# Patient Record
Sex: MALE | Race: ASIAN | NOT HISPANIC OR LATINO | Employment: OTHER | URBAN - METROPOLITAN AREA
[De-identification: names, ages, dates, MRNs, and addresses within clinical notes are randomized per-mention and may not be internally consistent; named-entity substitution may affect disease eponyms.]

---

## 2018-06-08 ENCOUNTER — APPOINTMENT (OUTPATIENT)
Dept: RADIOLOGY | Facility: MEDICAL CENTER | Age: 83
DRG: 871 | End: 2018-06-08
Attending: EMERGENCY MEDICINE
Payer: MEDICARE

## 2018-06-08 ENCOUNTER — HOSPITAL ENCOUNTER (INPATIENT)
Facility: MEDICAL CENTER | Age: 83
LOS: 3 days | DRG: 871 | End: 2018-06-11
Attending: EMERGENCY MEDICINE | Admitting: HOSPITALIST
Payer: MEDICARE

## 2018-06-08 DIAGNOSIS — S42.034A CLOSED NONDISPLACED FRACTURE OF ACROMIAL END OF RIGHT CLAVICLE, INITIAL ENCOUNTER: ICD-10-CM

## 2018-06-08 DIAGNOSIS — S22.31XA CLOSED FRACTURE OF ONE RIB OF RIGHT SIDE, INITIAL ENCOUNTER: ICD-10-CM

## 2018-06-08 DIAGNOSIS — S09.90XA CLOSED HEAD INJURY, INITIAL ENCOUNTER: ICD-10-CM

## 2018-06-08 DIAGNOSIS — R65.10 SIRS (SYSTEMIC INFLAMMATORY RESPONSE SYNDROME) (HCC): ICD-10-CM

## 2018-06-08 DIAGNOSIS — R00.0 TACHYCARDIA: ICD-10-CM

## 2018-06-08 LAB
ALBUMIN SERPL BCP-MCNC: 3.3 G/DL (ref 3.2–4.9)
ALBUMIN/GLOB SERPL: 0.9 G/DL
ALP SERPL-CCNC: 67 U/L (ref 30–99)
ALT SERPL-CCNC: 19 U/L (ref 2–50)
ANION GAP SERPL CALC-SCNC: 10 MMOL/L (ref 0–11.9)
ANION GAP SERPL CALC-SCNC: 8 MMOL/L (ref 0–11.9)
ANION GAP SERPL CALC-SCNC: 9 MMOL/L (ref 0–11.9)
APPEARANCE UR: CLEAR
AST SERPL-CCNC: 27 U/L (ref 12–45)
BACTERIA #/AREA URNS HPF: ABNORMAL /HPF
BASOPHILS # BLD AUTO: 0.1 % (ref 0–1.8)
BASOPHILS # BLD: 0.01 K/UL (ref 0–0.12)
BILIRUB SERPL-MCNC: 1.3 MG/DL (ref 0.1–1.5)
BILIRUB UR QL STRIP.AUTO: NEGATIVE
BUN SERPL-MCNC: 23 MG/DL (ref 8–22)
BUN SERPL-MCNC: 25 MG/DL (ref 8–22)
BUN SERPL-MCNC: 27 MG/DL (ref 8–22)
CALCIUM SERPL-MCNC: 7.3 MG/DL (ref 8.4–10.2)
CALCIUM SERPL-MCNC: 7.8 MG/DL (ref 8.4–10.2)
CALCIUM SERPL-MCNC: 8.4 MG/DL (ref 8.4–10.2)
CHLORIDE SERPL-SCNC: 90 MMOL/L (ref 96–112)
CHLORIDE SERPL-SCNC: 93 MMOL/L (ref 96–112)
CHLORIDE SERPL-SCNC: 96 MMOL/L (ref 96–112)
CO2 SERPL-SCNC: 18 MMOL/L (ref 20–33)
CO2 SERPL-SCNC: 20 MMOL/L (ref 20–33)
CO2 SERPL-SCNC: 20 MMOL/L (ref 20–33)
COLOR UR: YELLOW
CREAT SERPL-MCNC: 1.2 MG/DL (ref 0.5–1.4)
CREAT SERPL-MCNC: 1.21 MG/DL (ref 0.5–1.4)
CREAT SERPL-MCNC: 1.34 MG/DL (ref 0.5–1.4)
EOSINOPHIL # BLD AUTO: 0.01 K/UL (ref 0–0.51)
EOSINOPHIL NFR BLD: 0.1 % (ref 0–6.9)
ERYTHROCYTE [DISTWIDTH] IN BLOOD BY AUTOMATED COUNT: 45.6 FL (ref 35.9–50)
ERYTHROCYTE [DISTWIDTH] IN BLOOD BY AUTOMATED COUNT: 45.8 FL (ref 35.9–50)
FERRITIN SERPL-MCNC: 257.4 NG/ML (ref 22–322)
FOLATE SERPL-MCNC: >24 NG/ML
GLOBULIN SER CALC-MCNC: 3.5 G/DL (ref 1.9–3.5)
GLUCOSE SERPL-MCNC: 120 MG/DL (ref 65–99)
GLUCOSE SERPL-MCNC: 125 MG/DL (ref 65–99)
GLUCOSE SERPL-MCNC: 143 MG/DL (ref 65–99)
GLUCOSE UR STRIP.AUTO-MCNC: NEGATIVE MG/DL
HCT VFR BLD AUTO: 21.6 % (ref 42–52)
HCT VFR BLD AUTO: 25.9 % (ref 42–52)
HGB BLD-MCNC: 7.5 G/DL (ref 14–18)
HGB BLD-MCNC: 8.8 G/DL (ref 14–18)
IMM GRANULOCYTES # BLD AUTO: 0.05 K/UL (ref 0–0.11)
IMM GRANULOCYTES NFR BLD AUTO: 0.5 % (ref 0–0.9)
IRON SATN MFR SERPL: 19 % (ref 15–55)
IRON SERPL-MCNC: 48 UG/DL (ref 50–180)
KETONES UR STRIP.AUTO-MCNC: ABNORMAL MG/DL
LACTATE BLD-SCNC: 1.5 MMOL/L (ref 0.5–2)
LACTATE BLD-SCNC: 1.78 MMOL/L (ref 0.5–2)
LACTATE BLD-SCNC: 2.14 MMOL/L (ref 0.5–2)
LEUKOCYTE ESTERASE UR QL STRIP.AUTO: NEGATIVE
LYMPHOCYTES # BLD AUTO: 0.36 K/UL (ref 1–4.8)
LYMPHOCYTES NFR BLD: 3.3 % (ref 22–41)
MCH RBC QN AUTO: 33.2 PG (ref 27–33)
MCH RBC QN AUTO: 33.8 PG (ref 27–33)
MCHC RBC AUTO-ENTMCNC: 34 G/DL (ref 33.7–35.3)
MCHC RBC AUTO-ENTMCNC: 34.7 G/DL (ref 33.7–35.3)
MCV RBC AUTO: 97.3 FL (ref 81.4–97.8)
MCV RBC AUTO: 97.7 FL (ref 81.4–97.8)
MICRO URNS: ABNORMAL
MONOCYTES # BLD AUTO: 0.36 K/UL (ref 0–0.85)
MONOCYTES NFR BLD AUTO: 3.3 % (ref 0–13.4)
NEUTROPHILS # BLD AUTO: 10.09 K/UL (ref 1.82–7.42)
NEUTROPHILS NFR BLD: 92.7 % (ref 44–72)
NITRITE UR QL STRIP.AUTO: NEGATIVE
NRBC # BLD AUTO: 0 K/UL
NRBC BLD-RTO: 0 /100 WBC
OSMOLALITY UR: 417 MOSM/KG H2O (ref 300–900)
PH UR STRIP.AUTO: 6.5 [PH]
PLATELET # BLD AUTO: 190 K/UL (ref 164–446)
PLATELET # BLD AUTO: 205 K/UL (ref 164–446)
PMV BLD AUTO: 8.1 FL (ref 9–12.9)
PMV BLD AUTO: 8.2 FL (ref 9–12.9)
POTASSIUM SERPL-SCNC: 4.4 MMOL/L (ref 3.6–5.5)
POTASSIUM SERPL-SCNC: 4.7 MMOL/L (ref 3.6–5.5)
POTASSIUM SERPL-SCNC: 5.3 MMOL/L (ref 3.6–5.5)
PROCALCITONIN SERPL-MCNC: 2.36 NG/ML
PROT SERPL-MCNC: 6.8 G/DL (ref 6–8.2)
PROT UR QL STRIP: NEGATIVE MG/DL
RBC # BLD AUTO: 2.22 M/UL (ref 4.7–6.1)
RBC # BLD AUTO: 2.65 M/UL (ref 4.7–6.1)
RBC # URNS HPF: ABNORMAL /HPF
RBC UR QL AUTO: ABNORMAL
SODIUM SERPL-SCNC: 120 MMOL/L (ref 135–145)
SODIUM SERPL-SCNC: 122 MMOL/L (ref 135–145)
SODIUM SERPL-SCNC: 122 MMOL/L (ref 135–145)
SODIUM UR-SCNC: 61 MMOL/L
SP GR UR STRIP.AUTO: 1.01
TIBC SERPL-MCNC: 253 UG/DL (ref 250–450)
TROPONIN I SERPL-MCNC: <0.02 NG/ML (ref 0–0.04)
TSH SERPL DL<=0.005 MIU/L-ACNC: 2.62 UIU/ML (ref 0.38–5.33)
VIT B12 SERPL-MCNC: 983 PG/ML (ref 211–911)
WBC # BLD AUTO: 10.9 K/UL (ref 4.8–10.8)
WBC # BLD AUTO: 8.3 K/UL (ref 4.8–10.8)
WBC #/AREA URNS HPF: ABNORMAL /HPF

## 2018-06-08 PROCEDURE — 770020 HCHG ROOM/CARE - TELE (206)

## 2018-06-08 PROCEDURE — 87040 BLOOD CULTURE FOR BACTERIA: CPT | Mod: 91

## 2018-06-08 PROCEDURE — 83935 ASSAY OF URINE OSMOLALITY: CPT

## 2018-06-08 PROCEDURE — 700102 HCHG RX REV CODE 250 W/ 637 OVERRIDE(OP): Performed by: HOSPITALIST

## 2018-06-08 PROCEDURE — 85027 COMPLETE CBC AUTOMATED: CPT

## 2018-06-08 PROCEDURE — 80048 BASIC METABOLIC PNL TOTAL CA: CPT

## 2018-06-08 PROCEDURE — 99221 1ST HOSP IP/OBS SF/LOW 40: CPT | Mod: AI | Performed by: HOSPITALIST

## 2018-06-08 PROCEDURE — 93005 ELECTROCARDIOGRAM TRACING: CPT | Performed by: EMERGENCY MEDICINE

## 2018-06-08 PROCEDURE — 87077 CULTURE AEROBIC IDENTIFY: CPT

## 2018-06-08 PROCEDURE — 87186 SC STD MICRODIL/AGAR DIL: CPT

## 2018-06-08 PROCEDURE — 700105 HCHG RX REV CODE 258: Performed by: EMERGENCY MEDICINE

## 2018-06-08 PROCEDURE — 84443 ASSAY THYROID STIM HORMONE: CPT

## 2018-06-08 PROCEDURE — 83540 ASSAY OF IRON: CPT

## 2018-06-08 PROCEDURE — 84300 ASSAY OF URINE SODIUM: CPT

## 2018-06-08 PROCEDURE — 87070 CULTURE OTHR SPECIMN AEROBIC: CPT

## 2018-06-08 PROCEDURE — 71045 X-RAY EXAM CHEST 1 VIEW: CPT

## 2018-06-08 PROCEDURE — 87205 SMEAR GRAM STAIN: CPT

## 2018-06-08 PROCEDURE — 700111 HCHG RX REV CODE 636 W/ 250 OVERRIDE (IP): Performed by: HOSPITALIST

## 2018-06-08 PROCEDURE — 70450 CT HEAD/BRAIN W/O DYE: CPT

## 2018-06-08 PROCEDURE — 87086 URINE CULTURE/COLONY COUNT: CPT

## 2018-06-08 PROCEDURE — 82607 VITAMIN B-12: CPT

## 2018-06-08 PROCEDURE — 99285 EMERGENCY DEPT VISIT HI MDM: CPT

## 2018-06-08 PROCEDURE — 84145 PROCALCITONIN (PCT): CPT

## 2018-06-08 PROCEDURE — 81001 URINALYSIS AUTO W/SCOPE: CPT

## 2018-06-08 PROCEDURE — 96374 THER/PROPH/DIAG INJ IV PUSH: CPT

## 2018-06-08 PROCEDURE — 80053 COMPREHEN METABOLIC PANEL: CPT

## 2018-06-08 PROCEDURE — 83550 IRON BINDING TEST: CPT

## 2018-06-08 PROCEDURE — 83605 ASSAY OF LACTIC ACID: CPT

## 2018-06-08 PROCEDURE — 96361 HYDRATE IV INFUSION ADD-ON: CPT

## 2018-06-08 PROCEDURE — A9270 NON-COVERED ITEM OR SERVICE: HCPCS | Performed by: HOSPITALIST

## 2018-06-08 PROCEDURE — 36415 COLL VENOUS BLD VENIPUNCTURE: CPT

## 2018-06-08 PROCEDURE — 73030 X-RAY EXAM OF SHOULDER: CPT | Mod: RT

## 2018-06-08 PROCEDURE — 82728 ASSAY OF FERRITIN: CPT

## 2018-06-08 PROCEDURE — 82746 ASSAY OF FOLIC ACID SERUM: CPT

## 2018-06-08 PROCEDURE — 85025 COMPLETE CBC W/AUTO DIFF WBC: CPT

## 2018-06-08 PROCEDURE — 94760 N-INVAS EAR/PLS OXIMETRY 1: CPT

## 2018-06-08 PROCEDURE — 700111 HCHG RX REV CODE 636 W/ 250 OVERRIDE (IP): Performed by: EMERGENCY MEDICINE

## 2018-06-08 PROCEDURE — 700105 HCHG RX REV CODE 258: Performed by: HOSPITALIST

## 2018-06-08 PROCEDURE — 84484 ASSAY OF TROPONIN QUANT: CPT

## 2018-06-08 RX ORDER — CEFTRIAXONE 2 G/1
2 INJECTION, POWDER, FOR SOLUTION INTRAMUSCULAR; INTRAVENOUS ONCE
Status: COMPLETED | OUTPATIENT
Start: 2018-06-08 | End: 2018-06-08

## 2018-06-08 RX ORDER — SODIUM CHLORIDE 9 MG/ML
INJECTION, SOLUTION INTRAVENOUS CONTINUOUS
Status: DISCONTINUED | OUTPATIENT
Start: 2018-06-08 | End: 2018-06-11 | Stop reason: HOSPADM

## 2018-06-08 RX ORDER — SODIUM CHLORIDE 9 MG/ML
30 INJECTION, SOLUTION INTRAVENOUS ONCE
Status: COMPLETED | OUTPATIENT
Start: 2018-06-08 | End: 2018-06-08

## 2018-06-08 RX ORDER — BISACODYL 10 MG
10 SUPPOSITORY, RECTAL RECTAL
Status: DISCONTINUED | OUTPATIENT
Start: 2018-06-08 | End: 2018-06-11 | Stop reason: HOSPADM

## 2018-06-08 RX ORDER — SODIUM CHLORIDE 9 MG/ML
30 INJECTION, SOLUTION INTRAVENOUS
Status: DISCONTINUED | OUTPATIENT
Start: 2018-06-08 | End: 2018-06-11 | Stop reason: HOSPADM

## 2018-06-08 RX ORDER — ACETAMINOPHEN 160 MG
2000 TABLET,DISINTEGRATING ORAL EVERY MORNING
COMMUNITY

## 2018-06-08 RX ORDER — POLYETHYLENE GLYCOL 3350 17 G/17G
1 POWDER, FOR SOLUTION ORAL
Status: DISCONTINUED | OUTPATIENT
Start: 2018-06-08 | End: 2018-06-11 | Stop reason: HOSPADM

## 2018-06-08 RX ORDER — LOVASTATIN 20 MG/1
10 TABLET ORAL EVERY MORNING
Status: DISCONTINUED | OUTPATIENT
Start: 2018-06-09 | End: 2018-06-11 | Stop reason: HOSPADM

## 2018-06-08 RX ORDER — TAMSULOSIN HYDROCHLORIDE 0.4 MG/1
0.4 CAPSULE ORAL
Status: DISCONTINUED | OUTPATIENT
Start: 2018-06-09 | End: 2018-06-11 | Stop reason: HOSPADM

## 2018-06-08 RX ORDER — AMOXICILLIN 250 MG
2 CAPSULE ORAL 2 TIMES DAILY
Status: DISCONTINUED | OUTPATIENT
Start: 2018-06-08 | End: 2018-06-11 | Stop reason: HOSPADM

## 2018-06-08 RX ORDER — HEPARIN SODIUM 5000 [USP'U]/ML
5000 INJECTION, SOLUTION INTRAVENOUS; SUBCUTANEOUS EVERY 8 HOURS
Status: DISCONTINUED | OUTPATIENT
Start: 2018-06-08 | End: 2018-06-09

## 2018-06-08 RX ORDER — LOVASTATIN 10 MG/1
10 TABLET ORAL EVERY MORNING
COMMUNITY

## 2018-06-08 RX ORDER — SODIUM CHLORIDE 9 MG/ML
500 INJECTION, SOLUTION INTRAVENOUS
Status: COMPLETED | OUTPATIENT
Start: 2018-06-08 | End: 2018-06-08

## 2018-06-08 RX ORDER — ACETAMINOPHEN 325 MG/1
650 TABLET ORAL EVERY 6 HOURS PRN
Status: DISCONTINUED | OUTPATIENT
Start: 2018-06-08 | End: 2018-06-11 | Stop reason: HOSPADM

## 2018-06-08 RX ADMIN — CEFTRIAXONE SODIUM 2 G: 2 INJECTION, POWDER, FOR SOLUTION INTRAMUSCULAR; INTRAVENOUS at 10:26

## 2018-06-08 RX ADMIN — SODIUM CHLORIDE: 9 INJECTION, SOLUTION INTRAVENOUS at 21:03

## 2018-06-08 RX ADMIN — HEPARIN SODIUM 5000 UNITS: 5000 INJECTION, SOLUTION INTRAVENOUS; SUBCUTANEOUS at 14:43

## 2018-06-08 RX ADMIN — AMPICILLIN AND SULBACTAM 3 G: 2; 1 INJECTION, POWDER, FOR SOLUTION INTRAVENOUS at 16:23

## 2018-06-08 RX ADMIN — STANDARDIZED SENNA CONCENTRATE AND DOCUSATE SODIUM 2 TABLET: 8.6; 5 TABLET, FILM COATED ORAL at 21:11

## 2018-06-08 RX ADMIN — SODIUM CHLORIDE 500 ML: 9 INJECTION, SOLUTION INTRAVENOUS at 14:45

## 2018-06-08 RX ADMIN — SODIUM CHLORIDE 1290 ML: 9 INJECTION, SOLUTION INTRAVENOUS at 09:18

## 2018-06-08 RX ADMIN — HEPARIN SODIUM 5000 UNITS: 5000 INJECTION, SOLUTION INTRAVENOUS; SUBCUTANEOUS at 21:05

## 2018-06-08 ASSESSMENT — PATIENT HEALTH QUESTIONNAIRE - PHQ9
2. FEELING DOWN, DEPRESSED, IRRITABLE, OR HOPELESS: NOT AT ALL
SUM OF ALL RESPONSES TO PHQ9 QUESTIONS 1 AND 2: 0
2. FEELING DOWN, DEPRESSED, IRRITABLE, OR HOPELESS: NOT AT ALL
SUM OF ALL RESPONSES TO PHQ9 QUESTIONS 1 AND 2: 0
1. LITTLE INTEREST OR PLEASURE IN DOING THINGS: NOT AT ALL
1. LITTLE INTEREST OR PLEASURE IN DOING THINGS: NOT AT ALL

## 2018-06-08 ASSESSMENT — LIFESTYLE VARIABLES: EVER_SMOKED: NEVER

## 2018-06-08 ASSESSMENT — PAIN SCALES - WONG BAKER
WONGBAKER_NUMERICALRESPONSE: DOESN'T HURT AT ALL
WONGBAKER_NUMERICALRESPONSE: DOESN'T HURT AT ALL
WONGBAKER_NUMERICALRESPONSE: HURTS JUST A LITTLE BIT

## 2018-06-08 ASSESSMENT — PAIN SCALES - GENERAL
PAINLEVEL_OUTOF10: 0
PAINLEVEL_OUTOF10: 0
PAINLEVEL_OUTOF10: ASSUMED PAIN PRESENT
PAINLEVEL_OUTOF10: 0

## 2018-06-08 NOTE — ED NOTES
Pt bib family via w/c; c/o t5000 glf x1wk ago (last Thursday). Pt fell onto hs R side; abrasion and swelling and bruising to R cheek and R shoulder. Family at

## 2018-06-08 NOTE — ED NOTES
Pt resting in gurney, reports pain tolerable at this time. Assited to reposition, no other needs or complaints at this time. Abx finished infusing prior to assuming care.

## 2018-06-08 NOTE — ED NOTES
straight cath procedure explained and performed. Privacy maintained for pt. Pt tolerated procedure well. Urine to lab. Warm blanket provided. Will continue to monitor.

## 2018-06-08 NOTE — ED NOTES
Med rec complete per Rx bottles provided by pt's family at bedside. Meds reviewed with family. Bottles returned to family. WADE.

## 2018-06-08 NOTE — PROGRESS NOTES
Received report from ED RNs. Patient admitted to room 314-1, in the service of Dr. Perdue.  Patient's son reports that the family would like DNR status. Telemetry connected, orders received and carried out. Will continue to monitor.

## 2018-06-08 NOTE — ED NOTES
[place] fall assessment completed. Pt is High risk for fall. Interventions complete. Pt placed in yellow non slip socks, wrist band placed, green sign on door. Bed locked in low position, call light in place. Personal possessions in place. Personal needs assessed. Charge nurse notified to move pt to a more visible room if available. Safety assessed. Will monitor frequently.

## 2018-06-08 NOTE — ED NOTES
ERP at bedside. Pt agrees with plan of care discussed by ERP. AIDET acknowledged with patient. Yvonne in low position, side rail up for pt safety. Call light within reach. Will continue to monitor.

## 2018-06-08 NOTE — ED PROVIDER NOTES
"ED Provider Note    CHIEF COMPLAINT  Chief Complaint   Patient presents with   • T-5000 GLF       HPI  Charan Juan is a 89 y.o. male who presents with family given history that he fell about 6 days ago at his home when using his walker to the right side striking his right upper cheek laterally as well as his right shoulder which has become discolored after being initially ecchymotic and now more yellowed with age. He describes minimal shoulder discomfort with movement and his son reports that he has been weaker over the last few days after the fall. He does take daily aspirin. The patient denies any headache or new visual complaints. He is hard of hearing. Patient reports that he is DNR    REVIEW OF SYSTEMS  See HPI for further details. All other systems are negative.     PAST MEDICAL HISTORY  Past Medical History:   Diagnosis Date   • Hypertension        FAMILY HISTORY  No family history on file.    SOCIAL HISTORY   reports that he has never smoked. He does not have any smokeless tobacco history on file. He reports that he does not drink alcohol or use drugs.    SURGICAL HISTORY  Past Surgical History:   Procedure Laterality Date   • GASTROSCOPY WITH BIOPSY  11/5/08    Performed by NESHA VINES at ENDOSCOPY [facility] ORS   • COLONOSCOPY WITH POLYP  11/5/08    Performed by NESHA VINES at ENDOSCOPY [facility] ORS   • OTHER ABDOMINAL SURGERY      appendectomy       CURRENT MEDICATIONS  Home Medications    **Home medications have not yet been reviewed for this encounter**         ALLERGIES  Allergies   Allergen Reactions   • Nkda [No Known Drug Allergy]        PHYSICAL EXAM  VITAL SIGNS: BP (!) 89/66   Pulse (!) 117   Temp 36.1 °C (97 °F)   Resp 17   Ht 1.6 m (5' 3\")   Wt 43 kg (94 lb 12.8 oz)   SpO2 92%   BMI 16.79 kg/m²    Constitutional: Elderly male, hard of hearing, No acute distress, Non-toxic appearance.   HENT: Normocephalic, Atraumatic, Bilateral external ears normal, Oropharynx is clear " mucous membranes are dry. No oral exudates or nasal discharge.   Eyes: Pupils are equal round and reactive, EOMI, Conjunctiva normal, No discharge.   Neck: Normal range of motion, No tenderness, Supple, No stridor. No meningismus.  Lymphatic: No lymphadenopathy noted.   Cardiovascular: Tachycardic rate and rhythm without murmur rub or gallop.  Thorax & Lungs: Clear breath sounds bilaterally without wheezes, rhonchi or rales. There is no chest wall tenderness.   Abdomen: Soft non-tender non-distended. There is no rebound or guarding. No organomegaly is appreciated. Bowel sounds are normal.  Skin: Yellowing of skin about the right shoulder both anterior and posterior without rash.   Back: No CVA or spinal tenderness.   Extremities: Intact distal pulses, No edema, minimal right shoulder tenderness, No cyanosis, No clubbing. Capillary refill is less than 2 seconds.  Musculoskeletal: Good range of motion in all major joints. No tenderness to palpation or major deformities noted.   Neurologic: Alert & oriented x 3, Normal motor function, Normal sensory function, No focal deficits noted. Reflexes are normal. Gait not tested. He is mostly wheelchair and bedbound  Psychiatric: Affect normal, Judgment normal, Mood normal. There is no suicidal ideation or patient reported hallucinations.     EKG  EKG Interpretation    Interpreted by emergency department physician    Rhythm: sinus tachycardia  Rate: tachycardia  Axis: normal  Ectopy: none  Conduction: normal  ST Segments: no acute change  T Waves: no acute change  Q Waves: nonspecific    Clinical Impression: sinus tachycardia        RADIOLOGY/PROCEDURES  DX-SHOULDER 2+ RIGHT   Final Result      Inferiorly displaced distal thirds right clavicle acute fracture      Suspicious for right posterolateral second rib acute fracture      CT-HEAD W/O   Final Result      No noncontrast CT evidence of acute intracranial hemorrhage.      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  This is a nonspecific finding which usually is found to represent chronic microvascular disease in patient's of this demographic.  Demyelination, age indeterminant ischemia and gliosis are also common    possibilities.      DX-CHEST-PORTABLE (1 VIEW)   Final Result      Some new reticular basilar opacity could indicate bronchitis of acute or chronic nature. Aspiration pneumonitis is a consideration.      Stable aortic ectasia            COURSE & MEDICAL DECISION MAKING  Pertinent Labs & Imaging studies reviewed. (See chart for details)  The patient had a substantial fall 6 days ago that resulted in head injury and right shoulder injury. I was concerned about his presentation for tachycardia and dry mucous membranes and performs sepsis workup initially.    Normal saline 30 mL/kg IV was given for tachycardia and dry mucous membranes.    CT of the head was performed to rule out intracranial hemorrhage or mass this was negative for both. 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    Chest x-ray shows new reticular opacity that could be acute or chronic bronchitis versus aspiration pneumonitis. I initiated Rocephin therapy at after seeing his lactate at 2.1.    The patient was fluid responsive and is pressure improved to over 110. Tachycardia also improved downward to about 100.    Laboratory evaluation reveals urinalysis that is negative for infection. White blood cell counts elevated at 10,900 with 93% neutrophil shift. He is hyponatremic at 120 which is contributory without evidence of acidemia or renal dysfunction. BUN is elevated at 27 consistent with dehydration. Troponin is normal.    2nd lactate was obtained at 1131 hrs. which is down to 1.5 after 30 mL/kg of normal saline, again a positive response    We will continue to treat the patient for possible infection. He is admitted to Dr. Perdue.  He still has significant risk of decline but is DNR status.    Please note critical care time 30 minutes is spent in the care of this patient outside of procedure time    FINAL IMPRESSION  1. SIRS (systemic inflammatory response syndrome) (HCC)    2. Tachycardia    3. Closed head injury, initial encounter    4. Closed fracture of one rib of right side, initial encounter    5. Closed nondisplaced fracture of acromial end of right clavicle, initial encounter     critical care time, 30 minutes         Electronically signed by: Pedro Romano, 6/8/2018 9:14 AM

## 2018-06-08 NOTE — FLOWSHEET NOTE
06/08/18 1445   Events/Summary/Plan   Events/Summary/Plan RCP completed   Respiratory WDL   Respiratory (WDL) X   Chest Exam   Respiration 18   Heart Rate (Monitored) (!) 116   Breath Sounds   RUL Breath Sounds Diminished   RML Breath Sounds Diminished;Fine Crackles   RLL Breath Sounds Diminished;Fine Crackles   IGNACIO Breath Sounds Diminished   LLL Breath Sounds Diminished;Fine Crackles   Oximetry   #Pulse Oximetry (Single Determination) Yes   Oxygen   Home O2 Use Prior To Admission? No   Pulse Oximetry 94 %   O2 (LPM) 0   O2 Daily Delivery Respiratory  Room Air with O2 Available

## 2018-06-09 PROBLEM — A40.9 STREPTOCOCCAL SEPSIS (HCC): Status: ACTIVE | Noted: 2018-06-09

## 2018-06-09 PROBLEM — J69.0 ASPIRATION PNEUMONIA (HCC): Status: ACTIVE | Noted: 2018-06-09

## 2018-06-09 PROBLEM — S22.39XA RIB FRACTURE: Status: ACTIVE | Noted: 2018-06-09

## 2018-06-09 PROBLEM — E86.0 DEHYDRATION: Status: ACTIVE | Noted: 2018-06-09

## 2018-06-09 PROBLEM — D64.9 ANEMIA: Status: ACTIVE | Noted: 2018-06-09

## 2018-06-09 PROBLEM — N40.0 BENIGN PROSTATIC HYPERPLASIA WITHOUT LOWER URINARY TRACT SYMPTOMS: Status: ACTIVE | Noted: 2018-06-09

## 2018-06-09 PROBLEM — E87.1 HYPONATREMIA: Status: ACTIVE | Noted: 2018-06-09

## 2018-06-09 LAB
ANION GAP SERPL CALC-SCNC: 10 MMOL/L (ref 0–11.9)
ANION GAP SERPL CALC-SCNC: 9 MMOL/L (ref 0–11.9)
BUN SERPL-MCNC: 19 MG/DL (ref 8–22)
BUN SERPL-MCNC: 20 MG/DL (ref 8–22)
CALCIUM SERPL-MCNC: 7.7 MG/DL (ref 8.4–10.2)
CALCIUM SERPL-MCNC: 7.8 MG/DL (ref 8.4–10.2)
CHLORIDE SERPL-SCNC: 96 MMOL/L (ref 96–112)
CHLORIDE SERPL-SCNC: 98 MMOL/L (ref 96–112)
CO2 SERPL-SCNC: 19 MMOL/L (ref 20–33)
CO2 SERPL-SCNC: 20 MMOL/L (ref 20–33)
CREAT SERPL-MCNC: 1.14 MG/DL (ref 0.5–1.4)
CREAT SERPL-MCNC: 1.22 MG/DL (ref 0.5–1.4)
GLUCOSE SERPL-MCNC: 113 MG/DL (ref 65–99)
GLUCOSE SERPL-MCNC: 99 MG/DL (ref 65–99)
GRAM STN SPEC: NORMAL
IRON SATN MFR SERPL: 18 % (ref 15–55)
IRON SERPL-MCNC: 38 UG/DL (ref 50–180)
POTASSIUM SERPL-SCNC: 4.1 MMOL/L (ref 3.6–5.5)
POTASSIUM SERPL-SCNC: 4.3 MMOL/L (ref 3.6–5.5)
SIGNIFICANT IND 70042: NORMAL
SITE SITE: NORMAL
SODIUM SERPL-SCNC: 125 MMOL/L (ref 135–145)
SODIUM SERPL-SCNC: 127 MMOL/L (ref 135–145)
SOURCE SOURCE: NORMAL
TIBC SERPL-MCNC: 211 UG/DL (ref 250–450)

## 2018-06-09 PROCEDURE — 83540 ASSAY OF IRON: CPT

## 2018-06-09 PROCEDURE — 83550 IRON BINDING TEST: CPT

## 2018-06-09 PROCEDURE — G8996 SWALLOW CURRENT STATUS: HCPCS | Mod: CL

## 2018-06-09 PROCEDURE — 92610 EVALUATE SWALLOWING FUNCTION: CPT

## 2018-06-09 PROCEDURE — 306396 CUSHION, WAFFLE ADULT 17X17: Performed by: FAMILY MEDICINE

## 2018-06-09 PROCEDURE — 80048 BASIC METABOLIC PNL TOTAL CA: CPT

## 2018-06-09 PROCEDURE — 700105 HCHG RX REV CODE 258: Performed by: HOSPITALIST

## 2018-06-09 PROCEDURE — 700111 HCHG RX REV CODE 636 W/ 250 OVERRIDE (IP): Performed by: HOSPITALIST

## 2018-06-09 PROCEDURE — 770020 HCHG ROOM/CARE - TELE (206)

## 2018-06-09 PROCEDURE — 700102 HCHG RX REV CODE 250 W/ 637 OVERRIDE(OP): Performed by: FAMILY MEDICINE

## 2018-06-09 PROCEDURE — G8997 SWALLOW GOAL STATUS: HCPCS | Mod: CK

## 2018-06-09 PROCEDURE — 700102 HCHG RX REV CODE 250 W/ 637 OVERRIDE(OP): Performed by: HOSPITALIST

## 2018-06-09 PROCEDURE — 99232 SBSQ HOSP IP/OBS MODERATE 35: CPT | Performed by: FAMILY MEDICINE

## 2018-06-09 PROCEDURE — A9270 NON-COVERED ITEM OR SERVICE: HCPCS | Performed by: FAMILY MEDICINE

## 2018-06-09 PROCEDURE — A9270 NON-COVERED ITEM OR SERVICE: HCPCS | Performed by: HOSPITALIST

## 2018-06-09 RX ADMIN — METOPROLOL TARTRATE 25 MG: 25 TABLET ORAL at 14:06

## 2018-06-09 RX ADMIN — AMPICILLIN AND SULBACTAM 3 G: 2; 1 INJECTION, POWDER, FOR SOLUTION INTRAVENOUS at 14:03

## 2018-06-09 RX ADMIN — SODIUM CHLORIDE: 9 INJECTION, SOLUTION INTRAVENOUS at 09:41

## 2018-06-09 RX ADMIN — LOVASTATIN 10 MG: 20 TABLET ORAL at 09:34

## 2018-06-09 RX ADMIN — SODIUM CHLORIDE: 9 INJECTION, SOLUTION INTRAVENOUS at 21:34

## 2018-06-09 RX ADMIN — METOPROLOL TARTRATE 25 MG: 25 TABLET ORAL at 21:23

## 2018-06-09 RX ADMIN — STANDARDIZED SENNA CONCENTRATE AND DOCUSATE SODIUM 2 TABLET: 8.6; 5 TABLET, FILM COATED ORAL at 09:34

## 2018-06-09 RX ADMIN — STANDARDIZED SENNA CONCENTRATE AND DOCUSATE SODIUM 2 TABLET: 8.6; 5 TABLET, FILM COATED ORAL at 21:23

## 2018-06-09 RX ADMIN — ACETAMINOPHEN 650 MG: 325 TABLET, FILM COATED ORAL at 21:23

## 2018-06-09 RX ADMIN — TAMSULOSIN HYDROCHLORIDE 0.4 MG: 0.4 CAPSULE ORAL at 09:37

## 2018-06-09 RX ADMIN — AMPICILLIN AND SULBACTAM 3 G: 2; 1 INJECTION, POWDER, FOR SOLUTION INTRAVENOUS at 02:30

## 2018-06-09 RX ADMIN — HEPARIN SODIUM 5000 UNITS: 5000 INJECTION, SOLUTION INTRAVENOUS; SUBCUTANEOUS at 05:58

## 2018-06-09 ASSESSMENT — PATIENT HEALTH QUESTIONNAIRE - PHQ9
1. LITTLE INTEREST OR PLEASURE IN DOING THINGS: NOT AT ALL
2. FEELING DOWN, DEPRESSED, IRRITABLE, OR HOPELESS: NOT AT ALL
SUM OF ALL RESPONSES TO PHQ9 QUESTIONS 1 AND 2: 0

## 2018-06-09 ASSESSMENT — ENCOUNTER SYMPTOMS
RESPIRATORY NEGATIVE: 1
GASTROINTESTINAL NEGATIVE: 1
NEUROLOGICAL NEGATIVE: 1
MYALGIAS: 1
EYES NEGATIVE: 1
PSYCHIATRIC NEGATIVE: 1

## 2018-06-09 ASSESSMENT — PAIN SCALES - GENERAL: PAINLEVEL_OUTOF10: 2

## 2018-06-09 NOTE — ASSESSMENT & PLAN NOTE
This is sepsis (without associated acute organ dysfunction).     Iv unasyn started  Possibly 2nd to aspiration pneumona  D/w the son and the wife at the bed side

## 2018-06-09 NOTE — CARE PLAN
Problem: Venous Thromboembolism (VTW)/Deep Vein Thrombosis (DVT) Prevention:  Goal: Patient will participate in Venous Thrombosis (VTE)/Deep Vein Thrombosis (DVT)Prevention Measures  Outcome: PROGRESSING AS EXPECTED  Subcutaneous Heparin in use for prophylaxis    Problem: Skin Integrity  Goal: Risk for impaired skin integrity will decrease  Outcome: PROGRESSING AS EXPECTED  Assisted patient to turn & reposition every 2 hours,kept dry and clean. place pillows on bony prominences to prevent skin breakdown.

## 2018-06-09 NOTE — PROGRESS NOTES
Alert and oriented  to person,place and time.No edema, on room air sat 92%. Denied pain at this time.Due medication given per MAR. Family by the bedside.States understanding of POC.

## 2018-06-09 NOTE — H&P
Hospital Medicine History and Physical    Date of Service  6/8/2018    Chief Complaint  Chief Complaint   Patient presents with   • T-5000 GLF       History of Presenting Illness  Charan Juan is a 89 y.o. male who presents with family given history that he fell about 6 days ago at his home when using his walker to the right side striking his right upper cheek laterally as well as his right shoulder which has become swollen and bruised.  He describes minimal shoulder discomfort with movement and his son reports that he has been weaker over the last few days after the fall. He does take daily aspirin.     Xray of chest shows right rib fractures    He had a low BP in er attributed to dehydration    He was found to be anemic with hb 8.8    I checked and found elevated procalcitonin level. Concern for aspiration pneumonitis  Primary Care Physician  Napoleon Tobin M.D.    Consultants  none    Code Status    dnr    Review of Systems  Review of Systems   Constitutional: Positive for malaise/fatigue.   HENT: Negative.    Eyes: Negative.    Respiratory: Negative.    Cardiovascular: Positive for chest pain.   Gastrointestinal: Negative.    Genitourinary: Negative for dysuria and frequency.   Musculoskeletal: Positive for joint pain and myalgias.   Neurological: Negative.    Psychiatric/Behavioral: Negative.    All other systems reviewed and are negative.       Past Medical History  Past Medical History:   Diagnosis Date   • Hypertension        Surgical History  Past Surgical History:   Procedure Laterality Date   • GASTROSCOPY WITH BIOPSY  11/5/08    Performed by NESHA VINES at ENDOSCOPY [facility] ORS   • COLONOSCOPY WITH POLYP  11/5/08    Performed by NESHA VINES at ENDOSCOPY [facility] ORS   • OTHER ABDOMINAL SURGERY      appendectomy       Medications  No current facility-administered medications on file prior to encounter.      Current Outpatient Prescriptions on File Prior to Encounter   Medication Sig Dispense  Refill   • tamsulosin (FLOMAX) 0.4 MG capsule Take 0.4 mg by mouth ONE-HALF HOUR AFTER BREAKFAST.     • lisinopril (PRINIVIL) 10 MG TABS Take 10 mg by mouth every day.         Family History  No family history on file.    Social History  Social History   Substance Use Topics   • Smoking status: Never Smoker   • Smokeless tobacco: Not on file   • Alcohol use No       Allergies  Allergies   Allergen Reactions   • Nkda [No Known Drug Allergy]         Physical Exam  Laboratory   Hemodynamics  Temp (24hrs), Av.5 °C (97.7 °F), Min:36.1 °C (97 °F), Max:36.9 °C (98.5 °F)   Temperature: 36.2 °C (97.1 °F)  Pulse  Av.7  Min: 108  Max: 129 Heart Rate (Monitored): (!) 116  Blood Pressure : 112/72, NIBP: 101/70      Respiratory      Respiration: 18, Pulse Oximetry: 95 %, O2 Daily Delivery Respiratory : Room Air with O2 Available        RUL Breath Sounds: Diminished, RML Breath Sounds: Diminished, RLL Breath Sounds: Diminished, IGNACIO Breath Sounds: Diminished, LLL Breath Sounds: Diminished    Physical Exam   Constitutional: He is oriented to person, place, and time. No distress.   HENT:   Head: Normocephalic and atraumatic.   Mouth/Throat: No oropharyngeal exudate.   Eyes: Left eye exhibits no discharge. No scleral icterus.   Neck: No JVD present. No tracheal deviation present. No thyromegaly present.   Cardiovascular: Normal rate and regular rhythm.  Exam reveals no gallop and no friction rub.    No murmur heard.  Pulmonary/Chest: He exhibits tenderness (right).   Abdominal: He exhibits no distension. There is no tenderness. There is no rebound and no guarding.   Musculoskeletal:   Ecchymoses right shoulder and upper chest   Neurological: He is alert and oriented to person, place, and time.   General weakness   Skin: He is not diaphoretic.       Recent Labs      18   0920  18   2311   WBC  10.9*  8.3   RBC  2.65*  2.22*   HEMOGLOBIN  8.8*  7.5*   HEMATOCRIT  25.9*  21.6*   MCV  97.7  97.3   MCH  33.2*  33.8*    MCHC  34.0  34.7   RDW  45.6  45.8   PLATELETCT  205  190   MPV  8.1*  8.2*     Recent Labs      18   0920  18   1523  18   2311   SODIUM  120*  122*  122*   POTASSIUM  5.3  4.7  4.4   CHLORIDE  90*  96  93*   CO2  20  18*  20   GLUCOSE  143*  125*  120*   BUN  27*  25*  23*   CREATININE  1.34  1.20  1.21   CALCIUM  8.4  7.3*  7.8*     Recent Labs      18   0920  18   1523  18   2311   ALTSGPT  19   --    --    ASTSGOT  27   --    --    ALKPHOSPHAT  67   --    --    TBILIRUBIN  1.3   --    --    GLUCOSE  143*  125*  120*                 Lab Results   Component Value Date    TROPONINI <0.02 2018     Urinalysis:    Lab Results  Component Value Date/Time   SPECGRAVITY 1.010 2018 1012   GLUCOSEUR Negative 2018 1012   KETONES Trace (A) 2018 1012   NITRITE Negative 2018 1012   WBCURINE 0-2 (A) 2018 1012   RBCURINE 10-20 (A) 2018 1012   BACTERIA Rare (A) 2018 1012   EPITHELCELL Few 2013 1725        Imaging  Patient Name  Charan Juan ([id number]) Sex  Male   10/7/1921   Room Bed Code Status Current Location   [address] [place] [address] DNR 01   Reprint Order Requisition     DX-SHOULDER 2+ RIGHT (Order #[id number]) on 18   Last Resulted Time    10:46 AM   Images     Show images for DX-SHOULDER 2+ RIGHT   Imaging Result Status     Status: Final result (Exam End: 2018 10:39 AM)   Imaging Previous Results     Open Hard Copy Result Report (Order #[id number] - DX-SHOULDER 2+ RIGHT)   Narrative       2018 10:00 AM    HISTORY/REASON FOR EXAM: Fall yesterday. Bruising Pain/Deformity Following Trauma    TECHNIQUE/EXAM DESCRIPTION AND NUMBER OF VIEWS:  3 views of the RIGHT shoulder.    COMPARISON: None    FINDINGS:  There is a fracture involving the distal third of the clavicle which appears extra-articular. There is one third inferior shaft width displacement, apex cranial angulation. No acromioclavicular joint widening.    Suspicious  for a second posterolateral rib fracture    There is no other evidence of acute displaced fracture or dislocation.   Impression       Inferiorly displaced distal thirds right clavicle acute fracture    Suspicious for right posterolateral second rib acute fracture        Assessment/Plan     I anticipate this patient will require at least two midnights for appropriate medical management, necessitating inpatient admission.    * suspected Streptococcal sepsis (HCC)- (present on admission)   Assessment & Plan    This is sepsis (without associated acute organ dysfunction).     Iv unasyn started    Follow all cultures        Hyponatremia- (present on admission)   Assessment & Plan    Cause unknown    Check urine sodium and osmolarity    Hydrate with saline    Check bmp every 8 hours        Benign prostatic hyperplasia without lower urinary tract symptoms- (present on admission)   Assessment & Plan    Cont flomax        Rib fracture- (present on admission)   Assessment & Plan    Pain control        Dehydration- (present on admission)   Assessment & Plan    hydrate        Anemia- (present on admission)   Assessment & Plan    Cause  Unknown    Definitely some acute loss after right shoulder injury    Check anemia w/u    Check am cbc        Aspiration pneumonia (HCC)- (present on admission)   Assessment & Plan    Started iv unasyn    hydrate            VTE prophylaxis: scd

## 2018-06-09 NOTE — PROGRESS NOTES
[facility] Hospitalist Progress Note    Date of Service: 2018    Chief Complaint  89 y.o. male admitted 2018 with aspiration pneumonia  And hyponatremia    Interval Problem Update  none    Consultants/Specialty  none    Disposition  pending        Review of Systems   Unable to perform ROS: Acuity of condition      Physical Exam  Laboratory/Imaging   Hemodynamics  Temp (24hrs), Av.5 °C (97.7 °F), Min:36.2 °C (97.1 °F), Max:36.9 °C (98.5 °F)   Temperature: 36.6 °C (97.8 °F)  Pulse  Av.4  Min: 103  Max: 129 Heart Rate (Monitored): (!) 116  Blood Pressure : 146/86      Respiratory      Respiration: 18, Pulse Oximetry: 94 %, O2 Daily Delivery Respiratory : Room Air with O2 Available        RUL Breath Sounds: Diminished, RML Breath Sounds: Diminished, RLL Breath Sounds: Diminished, IGNACIO Breath Sounds: Diminished, LLL Breath Sounds: Diminished    Fluids    Intake/Output Summary (Last 24 hours) at 18 1236  Last data filed at 18 1800   Gross per 24 hour   Intake              390 ml   Output                0 ml   Net              390 ml       Nutrition  Orders Placed This Encounter   Procedures   • Diet Order     Standing Status:   Standing     Number of Occurrences:   1     Order Specific Question:   Diet:     Answer:   Regular [1]     Physical Exam   Constitutional: No distress.   HENT:   Head: Normocephalic.   Eyes: Conjunctivae are normal. Pupils are equal, round, and reactive to light.   Neck: Normal range of motion. Neck supple.   Cardiovascular: Normal rate and regular rhythm.    Pulmonary/Chest: Effort normal and breath sounds normal.   Abdominal: Soft. Bowel sounds are normal.   Musculoskeletal: He exhibits no edema.   Neurological: He is alert.   Skin: Skin is warm. Rash (echymosis on the r.shoulder) noted. He is not diaphoretic.       Recent Labs      18   0920  18   2311   WBC  10.9*  8.3   RBC  2.65*  2.22*   HEMOGLOBIN  8.8*  7.5*   HEMATOCRIT  25.9*  21.6*   MCV  97.7  97.3    MCH  33.2*  33.8*   MCHC  34.0  34.7   RDW  45.6  45.8   PLATELETCT  205  190   MPV  8.1*  8.2*     Recent Labs      06/08/18   1523  06/08/18   2311  06/09/18   0648   SODIUM  122*  122*  125*   POTASSIUM  4.7  4.4  4.3   CHLORIDE  96  93*  96   CO2  18*  20  19*   GLUCOSE  125*  120*  99   BUN  25*  23*  20   CREATININE  1.20  1.21  1.22   CALCIUM  7.3*  7.8*  7.8*                      Assessment/Plan     * suspected Streptococcal sepsis (HCC)- (present on admission)   Assessment & Plan    This is sepsis (without associated acute organ dysfunction).     Iv unasyn started  Possibly 2nd to aspiration pneumona  D/w the son and the wife at the bed side        Hyponatremia- (present on admission)   Assessment & Plan    Iv fluid n.s  Will check bmp in am          Benign prostatic hyperplasia without lower urinary tract symptoms- (present on admission)   Assessment & Plan    Cont flomax        Rib fracture- (present on admission)   Assessment & Plan    Pain control  Pt and ot        Dehydration- (present on admission)   Assessment & Plan    hydrate        Anemia- (present on admission)   Assessment & Plan    Normocytic  Iron studies  No sign of bleeding  Will check stool          Aspiration pneumonia (HCC)- (present on admission)   Assessment & Plan    Started iv unasyn  speech eval for diet modification          Quality-Core Measures   DVT prophylaxis - mechanical:  SCDs

## 2018-06-09 NOTE — DIETARY
NUTRITION SERVICES - Low BMI on Admit Brief Note  The pt is a 89 yo male with an admitting dx of dehydration, sinus tachycardia, sepsis, and aspiration pneumonitis.    Per review of chart the pt was noted to have BMI <19 (=16.79). Pt was using his walker on or around 6/2 and sustained GLF to right side striking his cheek and shoulder. Per MD notes, he was found to have R rib fractures and elevated procalcitonin, concerning for aspiration pneumonitis. PO intake has been adequate so far with 50-75% x2 meal records. No nutrition risk screening completed at this time. Attempted phone call to pt's room to discuss meals but no response. Pt visited in his room this afternoon and family in hallway. Son (Isma) provided some food preferences in order to better tailor meals for the pt. They have been bringing in some food from home. Isma encouraged to continue to do so. Family thanked for the information.    Labs, meds, fluids, GI, skin, PMHx, PO intake - all reviewed    Malnutrition Risk: Age, BMI <23 for age >65    Recommendations/Plan:  1. Continue current diet. Protein-centered afternoon snack.  2. Encourage intake of meals  3. Family to continue bringing in food from home  4. Document intake of all meals as % taken in ADL's to provide interdisciplinary communication across all shifts.   5. Monitor weight.  6. Nutrition rep will continue to see patient for ongoing meal and snack preferences.     RD monitoring.

## 2018-06-09 NOTE — THERAPY
"Speech Language Therapy Clinical Swallow Evaluation completed.    Functional Status: Pt seen today for clinical swallow evaluation. Pt quietly sleeping, awoke to name; son at bedside and served as . Oral mechanism exam completed and revealed diffuse weakness of orofacial musculature as well as decreased ROM of lingual structure. PO given, consisted of single ice chips via tsp, nectars, and purees. Laryngeal elevation palpated as weak across PO.  Pt with increase in wet vocal quality following trials of nectars and prolonged oral holding of purees upon palpation of swallow trigger. Lingual residue noted following all trials of puree and required multiple verbal cues to initiate additional swallows to clear, followed by delayed coughing, all of which is concerning for penetration/aspiration. Of note, pt appeared to fatigue easily as evaluation progressed. At this time, pt is presenting with severe oropharyngeal dysphagia as evidenced through weak laryngeal elevation, increase in wet vocal quality, lingual residue, and delayed coughing. Recommend NPO/TF pending MBS study to further evaluate swallow. Should pt choose to eat despite risk, would recommend a nectar thick full liquid diet in order to minimize but not prevent risk of aspiration. SLP to follow.     Recommendations - Diet: Diet / Liquid Recommendation: NPO, Pre-Feeding Trials with SLP Only                                                  Medication Administration: Medication Administration :  (IV if possible)Restrict to sips with meds    Plan of Care: Will benefit from Speech Therapy 3 times per week  Post-Acute Therapy: Discharge to a transitional care facility for continued skilled therapy services.    See \"Rehab Therapy-Acute\" Patient Summary Report for complete documentation.   "

## 2018-06-09 NOTE — CARE PLAN
Problem: Communication  Goal: The ability to communicate needs accurately and effectively will improve    Intervention: Use communication aids and/or /Language Line as appropriate  Patient speaks only Icelandic and Telugu, family at bedside to interpret.      Problem: Knowledge Deficit  Goal: Knowledge of the prescribed therapeutic regimen will improve    Intervention: Discuss information regarding therpeutic regimen and document in education  Discussed plan of care, with family acting as interpretors.

## 2018-06-09 NOTE — PROGRESS NOTES
Bedside report given to  Jamee RAMIREZ.Pt  Resting in the bed.Safety precautions in place and all the lines remain patent.

## 2018-06-09 NOTE — PROGRESS NOTES
0611 Bedside report received from NOC RN (Francoishuobed) at the beginning of the shift. Patient's in bed. IVF patent & infusing. No distress noted. Fall Protocol in effect. Son & spouse visiting.    6223 Dr Muir visited. Discussed plan of care with the patient, spouse & son (Isma). Son translates. Patient speaks Korean & Greek only. Questions answered. Verbalized understanding.     0934 Patient's sitting up in bed. IVF patent & infusing. Isma (son) translates. Patient rates shoulder pain 3/10, tolerable. Declines pain medication at this time. Noted bruising to right cheek with band aid, CD&I, bruising to right shoulder. FC to DD, noted clear yellow urine. Fall Protocol in effect. Call light within reach. Reminded patient & family to call for assist. Assessment completed. No distress noted. Plan of care reviewed with the patient & son. Questions answered. Verbalized understanding. Due am medications given.

## 2018-06-09 NOTE — CARE PLAN
Problem: Safety  Goal: Will remain free from injury  Outcome: PROGRESSING AS EXPECTED  Safety precaution in place. Non skids socks in use. Bed alarm audible. Hourly rounds I effect. Verbalized understanding. Discussed with Isma (son) & he translates, pt speaks Mexican & Romansh only.    Problem: Infection  Goal: Will remain free from infection  Outcome: PROGRESSING AS EXPECTED  Implement Standard precaution. Hand washing every encounter & before & after patient care. Isma (son) discussed & translates. patient speaks Mexican & Romansh only. Verbalized understanding.    Problem: Knowledge Deficit  Goal: Knowledge of disease process/condition, treatment plan, diagnostic tests, and medications will improve  Outcome: PROGRESSING AS EXPECTED  Discussed Plan of care. PT & OT Eval. SLP Eval. Questions answered. Discussed/translates by Isma (son). Verbalized understanding. Patient speaks Mexican & Romansh only.

## 2018-06-09 NOTE — ASSESSMENT & PLAN NOTE
Normocytic  Iron studies with low iron  Added ferrous sulfate  No sign of bleeding  D/w the son and he agrees with no further investigation due to  Old age   I recommended to d/w pcp about hospice and he stated he will.

## 2018-06-09 NOTE — CARE PLAN
Problem: Nutritional:  Goal: Achieve adequate nutritional intake  Patient will consume >/= 50% of meals/snacks consistently  Outcome: PROGRESSING AS EXPECTED

## 2018-06-09 NOTE — PROGRESS NOTES
1530 Julieth SLP worked with the patient.    1610 Julieth SLP notified Primary RN & Dr Muir (over the phone) of the swallow Eval result.

## 2018-06-09 NOTE — PROGRESS NOTES
Telemetry Report: pt has been ST.  HR: 100s-120  Measurements: (.16/.06/.30)  Ectopy: r PAC, r PVC. Prolonged QT    Measurements and updates per Divine JONES

## 2018-06-09 NOTE — PROGRESS NOTES
Tele Summary    Rhythm Sinus Rhythm, Sinus Tachycardia  Ectopy occasional PVC    IA 0.16  QRS 0.08  QT 0.28

## 2018-06-09 NOTE — PROGRESS NOTES
1315 Patient's in bed. No distress noted. IVF patent & infusing. Fall Protocol in effect. Spouse & son visiting.

## 2018-06-10 LAB
ANION GAP SERPL CALC-SCNC: 8 MMOL/L (ref 0–11.9)
ANION GAP SERPL CALC-SCNC: 8 MMOL/L (ref 0–11.9)
ANION GAP SERPL CALC-SCNC: 9 MMOL/L (ref 0–11.9)
BACTERIA UR CULT: NORMAL
BASOPHILS # BLD AUTO: 0.2 % (ref 0–1.8)
BASOPHILS # BLD: 0.02 K/UL (ref 0–0.12)
BUN SERPL-MCNC: 15 MG/DL (ref 8–22)
BUN SERPL-MCNC: 15 MG/DL (ref 8–22)
BUN SERPL-MCNC: 16 MG/DL (ref 8–22)
CALCIUM SERPL-MCNC: 7.7 MG/DL (ref 8.4–10.2)
CALCIUM SERPL-MCNC: 7.8 MG/DL (ref 8.4–10.2)
CALCIUM SERPL-MCNC: 8.2 MG/DL (ref 8.4–10.2)
CHLORIDE SERPL-SCNC: 100 MMOL/L (ref 96–112)
CHLORIDE SERPL-SCNC: 102 MMOL/L (ref 96–112)
CHLORIDE SERPL-SCNC: 103 MMOL/L (ref 96–112)
CO2 SERPL-SCNC: 18 MMOL/L (ref 20–33)
CO2 SERPL-SCNC: 19 MMOL/L (ref 20–33)
CO2 SERPL-SCNC: 20 MMOL/L (ref 20–33)
CREAT SERPL-MCNC: 1.02 MG/DL (ref 0.5–1.4)
CREAT SERPL-MCNC: 1.05 MG/DL (ref 0.5–1.4)
CREAT SERPL-MCNC: 1.09 MG/DL (ref 0.5–1.4)
EOSINOPHIL # BLD AUTO: 0.06 K/UL (ref 0–0.51)
EOSINOPHIL NFR BLD: 0.7 % (ref 0–6.9)
ERYTHROCYTE [DISTWIDTH] IN BLOOD BY AUTOMATED COUNT: 46.9 FL (ref 35.9–50)
GLUCOSE SERPL-MCNC: 92 MG/DL (ref 65–99)
GLUCOSE SERPL-MCNC: 92 MG/DL (ref 65–99)
GLUCOSE SERPL-MCNC: 96 MG/DL (ref 65–99)
HCT VFR BLD AUTO: 23.4 % (ref 42–52)
HGB BLD-MCNC: 7.9 G/DL (ref 14–18)
IMM GRANULOCYTES # BLD AUTO: 0.07 K/UL (ref 0–0.11)
IMM GRANULOCYTES NFR BLD AUTO: 0.8 % (ref 0–0.9)
LYMPHOCYTES # BLD AUTO: 0.53 K/UL (ref 1–4.8)
LYMPHOCYTES NFR BLD: 6 % (ref 22–41)
MCH RBC QN AUTO: 33.3 PG (ref 27–33)
MCHC RBC AUTO-ENTMCNC: 33.8 G/DL (ref 33.7–35.3)
MCV RBC AUTO: 98.7 FL (ref 81.4–97.8)
MONOCYTES # BLD AUTO: 0.36 K/UL (ref 0–0.85)
MONOCYTES NFR BLD AUTO: 4.1 % (ref 0–13.4)
NEUTROPHILS # BLD AUTO: 7.84 K/UL (ref 1.82–7.42)
NEUTROPHILS NFR BLD: 88.2 % (ref 44–72)
NRBC # BLD AUTO: 0 K/UL
NRBC BLD-RTO: 0 /100 WBC
PLATELET # BLD AUTO: 211 K/UL (ref 164–446)
PMV BLD AUTO: 7.8 FL (ref 9–12.9)
POTASSIUM SERPL-SCNC: 4.1 MMOL/L (ref 3.6–5.5)
POTASSIUM SERPL-SCNC: 4.1 MMOL/L (ref 3.6–5.5)
POTASSIUM SERPL-SCNC: 4.4 MMOL/L (ref 3.6–5.5)
RBC # BLD AUTO: 2.37 M/UL (ref 4.7–6.1)
SIGNIFICANT IND 70042: NORMAL
SITE SITE: NORMAL
SODIUM SERPL-SCNC: 129 MMOL/L (ref 135–145)
SOURCE SOURCE: NORMAL
WBC # BLD AUTO: 8.9 K/UL (ref 4.8–10.8)

## 2018-06-10 PROCEDURE — 700102 HCHG RX REV CODE 250 W/ 637 OVERRIDE(OP): Performed by: HOSPITALIST

## 2018-06-10 PROCEDURE — 99232 SBSQ HOSP IP/OBS MODERATE 35: CPT | Performed by: FAMILY MEDICINE

## 2018-06-10 PROCEDURE — G8979 MOBILITY GOAL STATUS: HCPCS | Mod: CK

## 2018-06-10 PROCEDURE — 770020 HCHG ROOM/CARE - TELE (206)

## 2018-06-10 PROCEDURE — 700102 HCHG RX REV CODE 250 W/ 637 OVERRIDE(OP): Performed by: FAMILY MEDICINE

## 2018-06-10 PROCEDURE — 85025 COMPLETE CBC W/AUTO DIFF WBC: CPT

## 2018-06-10 PROCEDURE — 80048 BASIC METABOLIC PNL TOTAL CA: CPT | Mod: 91

## 2018-06-10 PROCEDURE — A9270 NON-COVERED ITEM OR SERVICE: HCPCS | Performed by: FAMILY MEDICINE

## 2018-06-10 PROCEDURE — 97162 PT EVAL MOD COMPLEX 30 MIN: CPT

## 2018-06-10 PROCEDURE — 700111 HCHG RX REV CODE 636 W/ 250 OVERRIDE (IP): Performed by: HOSPITALIST

## 2018-06-10 PROCEDURE — G8978 MOBILITY CURRENT STATUS: HCPCS | Mod: CL

## 2018-06-10 PROCEDURE — 700105 HCHG RX REV CODE 258: Performed by: HOSPITALIST

## 2018-06-10 PROCEDURE — A9270 NON-COVERED ITEM OR SERVICE: HCPCS | Performed by: HOSPITALIST

## 2018-06-10 RX ORDER — SODIUM CHLORIDE 1 G/1
1 TABLET ORAL
Status: DISCONTINUED | OUTPATIENT
Start: 2018-06-10 | End: 2018-06-11 | Stop reason: HOSPADM

## 2018-06-10 RX ORDER — FERROUS SULFATE 325(65) MG
325 TABLET ORAL
Status: DISCONTINUED | OUTPATIENT
Start: 2018-06-11 | End: 2018-06-11 | Stop reason: HOSPADM

## 2018-06-10 RX ADMIN — METOPROLOL TARTRATE 25 MG: 25 TABLET ORAL at 21:32

## 2018-06-10 RX ADMIN — TAMSULOSIN HYDROCHLORIDE 0.4 MG: 0.4 CAPSULE ORAL at 11:45

## 2018-06-10 RX ADMIN — STANDARDIZED SENNA CONCENTRATE AND DOCUSATE SODIUM 2 TABLET: 8.6; 5 TABLET, FILM COATED ORAL at 11:45

## 2018-06-10 RX ADMIN — SODIUM CHLORIDE TAB 1 GM 1 G: 1 TAB at 16:25

## 2018-06-10 RX ADMIN — AMPICILLIN AND SULBACTAM 3 G: 2; 1 INJECTION, POWDER, FOR SOLUTION INTRAVENOUS at 21:32

## 2018-06-10 RX ADMIN — STANDARDIZED SENNA CONCENTRATE AND DOCUSATE SODIUM 2 TABLET: 8.6; 5 TABLET, FILM COATED ORAL at 21:32

## 2018-06-10 RX ADMIN — AMPICILLIN AND SULBACTAM 3 G: 2; 1 INJECTION, POWDER, FOR SOLUTION INTRAVENOUS at 02:25

## 2018-06-10 RX ADMIN — METOPROLOL TARTRATE 25 MG: 25 TABLET ORAL at 11:46

## 2018-06-10 ASSESSMENT — GAIT ASSESSMENTS
DEVIATION: DECREASED HEEL STRIKE
ASSISTIVE DEVICE: FRONT WHEEL WALKER
GAIT LEVEL OF ASSIST: CONTACT GUARD ASSIST
DISTANCE (FEET): 30

## 2018-06-10 NOTE — CARE PLAN
Problem: Venous Thromboembolism (VTW)/Deep Vein Thrombosis (DVT) Prevention:  Goal: Patient will participate in Venous Thrombosis (VTE)/Deep Vein Thrombosis (DVT)Prevention Measures  Outcome: PROGRESSING AS EXPECTED  Sequential compression device in use for prophylaxis    Problem: Skin Integrity  Goal: Risk for impaired skin integrity will decrease  Outcome: PROGRESSING AS EXPECTED  Turned & repositioned every 2 hours,kept dry and clean. place pillows on bony prominences to prevent skin breakdown.

## 2018-06-10 NOTE — PROGRESS NOTES
Telemetry Report: pt has been SR/ST.  HR: 80s-100  Measurements: (.18/.06/.30)  Ectopy: o PVC.    Measurements and updates per Divine JONES

## 2018-06-10 NOTE — PROGRESS NOTES
[facility]    Date of Service: 6/10/2018    Chief Complaint  89 y.o. male admitted 2018 with aspiration pneumonia  And hyponatremia    Interval Problem Update  none    Consultants/Specialty  none    Disposition  pending        Review of Systems   Unable to perform ROS: Acuity of condition      Physical Exam  Laboratory/Imaging   Hemodynamics  Temp (24hrs), Av.5 °C (97.7 °F), Min:36.2 °C (97.2 °F), Max:36.8 °C (98.2 °F)   Temperature: 36.2 °C (97.2 °F)  Pulse  Av.5  Min: 93  Max: 129   Blood Pressure : 119/74      Respiratory      Respiration: 18, Pulse Oximetry: 93 %        RUL Breath Sounds: Diminished, RML Breath Sounds: Diminished, RLL Breath Sounds: Diminished, IGNACIO Breath Sounds: Diminished, LLL Breath Sounds: Diminished    Fluids    Intake/Output Summary (Last 24 hours) at 06/10/18 1236  Last data filed at 06/10/18 0800   Gross per 24 hour   Intake             1045 ml   Output              800 ml   Net              245 ml       Nutrition  Orders Placed This Encounter   Procedures   • DIET NPO     Standing Status:   Standing     Number of Occurrences:   1     Order Specific Question:   Restrict to:     Answer:   Sips with Medications [3]     Physical Exam   Constitutional: No distress.   HENT:   Right Ear: External ear normal.   Left Ear: External ear normal.   Eyes: Right eye exhibits no discharge. Left eye exhibits no discharge.   Neck: No JVD present.   Cardiovascular: Normal heart sounds.    Pulmonary/Chest: No stridor. No respiratory distress. He has no wheezes. He has no rales.   Abdominal: He exhibits no distension. There is no tenderness. There is no rebound.   Musculoskeletal: He exhibits no edema.   Neurological: He is alert.   Skin: Skin is warm. Rash (echymosis on the r.shoulder) noted. He is not diaphoretic.       Recent Labs      18   0920  18   2311  06/10/18   0054   WBC  10.9*  8.3  8.9   RBC  2.65*  2.22*  2.37*   HEMOGLOBIN  8.8*  7.5*  7.9*    HEMATOCRIT  25.9*  21.6*  23.4*   MCV  97.7  97.3  98.7*   MCH  33.2*  33.8*  33.3*   MCHC  34.0  34.7  33.8   RDW  45.6  45.8  46.9   PLATELETCT  205  190  211   MPV  8.1*  8.2*  7.8*     Recent Labs      06/09/18   1653  06/10/18   0054  06/10/18   0856   SODIUM  127*  129*  129*   POTASSIUM  4.1  4.1  4.1   CHLORIDE  98  100  102   CO2  20  20  19*   GLUCOSE  113*  96  92   BUN  19  16  15   CREATININE  1.14  1.09  1.02   CALCIUM  7.7*  8.2*  7.7*                      Assessment/Plan     * suspected Streptococcal sepsis (HCC)- (present on admission)   Assessment & Plan    This is sepsis (without associated acute organ dysfunction).     Iv unasyn started  Possibly 2nd to aspiration pneumona  D/w the son and the wife at the bed side        Hyponatremia- (present on admission)   Assessment & Plan    Has improved a bit  Iv fluid n.s  Will check bmp in am  Add salt tablets          Benign prostatic hyperplasia without lower urinary tract symptoms- (present on admission)   Assessment & Plan    Cont flomax        Rib fracture- (present on admission)   Assessment & Plan    Pain control  Pt and ot        Dehydration- (present on admission)   Assessment & Plan    hydrate        Anemia- (present on admission)   Assessment & Plan    Normocytic  Iron studies with low iron  Added ferrous sulfate  No sign of bleeding  D/w the son and he agrees with no further investigation due to  Old age   I recommended to d/w pcp about hospice and he stated he will.          Aspiration pneumonia (HCC)- (present on admission)   Assessment & Plan    unasyn  Modified diet as per speech          Quality-Core Measures   DVT prophylaxis - mechanical:  SCDs

## 2018-06-10 NOTE — PROGRESS NOTES
Bedside report given to  Nato RAMIREZ.Pt  Resting in the bed.Safety precautions in place and all the lines remain patent.

## 2018-06-10 NOTE — PROGRESS NOTES
Alert and oriented  to person,place and time.on room air sat 93%. Complained of generalized pain 2/10 at this time.PRN and due medication given per MAR. Family by bed side, POC explained.

## 2018-06-10 NOTE — THERAPY
"Physical Therapy Evaluation completed.   Bed Mobility:  Supine to Sit: Minimal Assist  Transfers: Sit to Stand: Contact Guard Assist  Gait: Level Of Assist: Contact Guard Assist with Front-Wheel Walker    X 25 feet   Plan of Care: Will benefit from Physical Therapy 5 times per week  Discharge Recommendations: Equipment: No Equipment Needed. SNF vrs Home    See \"Rehab Therapy-Acute\" Patient Summary Report for complete documentation.     "

## 2018-06-11 VITALS
WEIGHT: 94.8 LBS | TEMPERATURE: 97.8 F | OXYGEN SATURATION: 93 % | SYSTOLIC BLOOD PRESSURE: 134 MMHG | BODY MASS INDEX: 16.8 KG/M2 | DIASTOLIC BLOOD PRESSURE: 87 MMHG | HEIGHT: 63 IN | HEART RATE: 100 BPM | RESPIRATION RATE: 16 BRPM

## 2018-06-11 LAB
ANION GAP SERPL CALC-SCNC: 7 MMOL/L (ref 0–11.9)
BACTERIA SPEC RESP CULT: ABNORMAL
BACTERIA SPEC RESP CULT: ABNORMAL
BUN SERPL-MCNC: 13 MG/DL (ref 8–22)
CALCIUM SERPL-MCNC: 7.8 MG/DL (ref 8.4–10.2)
CHLORIDE SERPL-SCNC: 106 MMOL/L (ref 96–112)
CO2 SERPL-SCNC: 19 MMOL/L (ref 20–33)
CREAT SERPL-MCNC: 1.07 MG/DL (ref 0.5–1.4)
GLUCOSE SERPL-MCNC: 77 MG/DL (ref 65–99)
GRAM STN SPEC: ABNORMAL
POTASSIUM SERPL-SCNC: 4.3 MMOL/L (ref 3.6–5.5)
SIGNIFICANT IND 70042: ABNORMAL
SITE SITE: ABNORMAL
SODIUM SERPL-SCNC: 132 MMOL/L (ref 135–145)
SOURCE SOURCE: ABNORMAL

## 2018-06-11 PROCEDURE — 99239 HOSP IP/OBS DSCHRG MGMT >30: CPT | Performed by: FAMILY MEDICINE

## 2018-06-11 PROCEDURE — 80048 BASIC METABOLIC PNL TOTAL CA: CPT

## 2018-06-11 PROCEDURE — 700105 HCHG RX REV CODE 258: Performed by: HOSPITALIST

## 2018-06-11 RX ORDER — SODIUM CHLORIDE 1 G/1
1 TABLET ORAL
Qty: 90 TAB | Refills: 0 | Status: SHIPPED | OUTPATIENT
Start: 2018-06-11

## 2018-06-11 RX ORDER — CEFDINIR 300 MG/1
300 CAPSULE ORAL 2 TIMES DAILY
Qty: 8 CAP | Refills: 0 | Status: SHIPPED | OUTPATIENT
Start: 2018-06-11

## 2018-06-11 RX ORDER — FERROUS SULFATE 325(65) MG
325 TABLET ORAL
Qty: 30 TAB | Refills: 0 | Status: SHIPPED | OUTPATIENT
Start: 2018-06-12

## 2018-06-11 RX ADMIN — SODIUM CHLORIDE: 9 INJECTION, SOLUTION INTRAVENOUS at 06:31

## 2018-06-11 ASSESSMENT — PAIN SCALES - GENERAL: PAINLEVEL_OUTOF10: 0

## 2018-06-11 NOTE — DISCHARGE SUMMARY
Discharge Summary    CHIEF COMPLAINT ON ADMISSION  Chief Complaint   Patient presents with   • T-5000 GLF       Reason for Admission  fall right shoulder pain     Admission Date  6/8/2018    CODE STATUS  DNR    HPI & HOSPITAL COURSE  This is a 89 y.o. male here with  family given history that he fell about 6 days ago at his home when using his walker to the right side striking his right upper cheek laterally as well as his right shoulder which has become swollen and bruised.  He describes minimal shoulder discomfort with movement and his son reports that he has been weaker over the last few days after the fall. He was admitted and was noted to have aspiration pneumonia and hyponatremia,he was started on iv unasyn and was started on iv fluid n.s and sdalt tablets.he was ulices noted to have anemia chronically most likely 2nd to slow gi bleed. And he was noted to have low iron as well.his shoulder xray showed:Inferiorly displaced distal thirds right clavicle acute fracture    Suspicious for right posterolateral second rib acute fracture.  Speech therapist saw the pt and recommended modified diet and further studies.  However I had long discussion with the pt's son and his wife and they have decided to take him home and not to do any further studies due to his old age.  I recommended hospice and the son stated that he will speak to the pcp.also for hyponatrenmia he received iv fluid n.s and salt tablets and improved.for his htn I dced lisinopril and started lopressor.   he needs to be on cefdinir for 4 more days and tylenol for pain control for clavical fx and sling and f/u with pcp.    Therefore, he is discharged in guarded and stable condition to home with close outpatient follow-up.    The patient met 2-midnight criteria for an inpatient stay at the time of discharge.    Discharge Date  6/11/18    FOLLOW UP ITEMS POST DISCHARGE  pcp this week or next week    DISCHARGE DIAGNOSES  Principal Problem:    suspected  Streptococcal sepsis (HCC) POA: Yes  Active Problems:    Hyponatremia POA: Yes    Aspiration pneumonia (HCC) POA: Yes    Anemia POA: Yes    Dehydration POA: Yes    Rib fracture POA: Yes    Benign prostatic hyperplasia without lower urinary tract symptoms POA: Yes  Resolved Problems:    * No resolved hospital problems. *      FOLLOW UP  No future appointments.  Napoleon Tobin M.D.  [address] [facility] [id number] [phone]    Schedule an appointment as soon as possible for a visit in 1 week  Follow up appointment      MEDICATIONS ON DISCHARGE     Medication List      START taking these medications      Instructions   metoprolol 25 MG Tabs  Commonly known as:  LOPRESSOR   Take 1 Tab by mouth 2 Times a Day.  Dose:  25 mg     sodium chloride 1 GM Tabs  Commonly known as:  SALT   Take 1 Tab by mouth 3 times a day, with meals.  Dose:  1 g        CONTINUE taking these medications      Instructions   lovastatin 10 MG tablet  Commonly known as:  MEVACOR   Take 10 mg by mouth every morning.  Dose:  10 mg     tamsulosin 0.4 MG capsule  Commonly known as:  FLOMAX   Take 0.4 mg by mouth ONE-HALF HOUR AFTER BREAKFAST.  Dose:  0.4 mg     Vitamin D3 2000 UNIT Caps   Take 2,000 Units by mouth every morning.  Dose:  2000 Units        STOP taking these medications    aspirin 81 MG tablet     lisinopril 10 MG Tabs  Commonly known as:  PRINIVIL        cefdinir 300mg po bid for 4 days  Ferrous sulfate 325 mg po daily  Allergies  Allergies   Allergen Reactions   • Nkda [No Known Drug Allergy]        DIET  Orders Placed This Encounter   Procedures   • DIET NPO     Standing Status:   Standing     Number of Occurrences:   1     Order Specific Question:   Restrict to:     Answer:   Sips with Medications [3]   • DISCONTINUE DIET TRAY     Standing Status:   Standing     Number of Occurrences:   1       ACTIVITY  As tolerated.  Weight bearing as tolerated    CONSULTATIONS  none    PROCEDURES  none    LABORATORY  Lab Results    Component Value Date    SODIUM 132 (L) 06/11/2018    POTASSIUM 4.3 06/11/2018    CHLORIDE 106 06/11/2018    CO2 19 (L) 06/11/2018    GLUCOSE 77 06/11/2018    BUN 13 06/11/2018    CREATININE 1.07 06/11/2018    CREATININE 0.9 11/06/2008        Lab Results   Component Value Date    WBC 8.9 06/10/2018    HEMOGLOBIN 7.9 (L) 06/10/2018    HEMATOCRIT 23.4 (L) 06/10/2018    PLATELETCT 211 06/10/2018        Total time of the discharge process exceeds 35 minutes.

## 2018-06-11 NOTE — PROGRESS NOTES
0730Report received, plan of care reviewed and discussed, assessment complete, oriented to room, bed alarm on, nonskid socks applied, advised to call for assistance. MD at bedside to discuss plans for discharge.  0900 Discharge instructions reviewed, family at bedside, encouraged and answered all questions, discharged to home with family.

## 2018-06-11 NOTE — PROGRESS NOTES
Patient received stable, vitals wnl, alert times 2-3, lethargic, requiring suctioning throughout the day, discharge held due to low sodium 129. To be discharged tomorrow is sodium corrected

## 2018-06-11 NOTE — DISCHARGE INSTRUCTIONS
Discharge Instructions    Discharged to home by car with relative. Discharged via wheelchair, hospital escort: Yes.  Special equipment needed: Not Applicable    Be sure to schedule a follow-up appointment with your primary care doctor or any specialists as instructed.     Discharge Plan:   Diet Plan: Discussed  Activity Level: Discussed  Confirmed Follow up Appointment: Appointment Scheduled  Confirmed Symptoms Management: Discussed  Medication Reconciliation Updated: Yes  Influenza Vaccine Indication: Patient Refuses    I understand that a diet low in cholesterol, fat, and sodium is recommended for good health. Unless I have been given specific instructions below for another diet, I accept this instruction as my diet prescription.     Special Instructions:   Aspiration Pneumonia  Aspiration pneumonia is an infection in your lungs. It occurs when food, liquid, or stomach contents (vomit) are inhaled (aspirated) into your lungs. When these things get into your lungs, swelling (inflammation) and infection can occur. This can make it difficult for you to breathe. Aspiration pneumonia is a serious condition and can be life threatening.  What increases the risk?  Aspiration pneumonia is more likely to occur when a person's cough (gag) reflex or ability to swallow has been decreased. Some things that can do this include:  · Having a brain injury or disease, such as stroke, seizures, Parkinson's disease, dementia, or amyotrophic lateral sclerosis (ALS).  · Being given general anesthetic for procedures.  · Being in a coma (unconscious).  · Having a narrowing of the tube that carries food to the stomach (esophagus).  · Drinking too much alcohol. If a person passes out and vomits, vomit can be swallowed into the lungs.  · Taking certain medicines, such as tranquilizers or sedatives.  What are the signs or symptoms?  · Coughing after swallowing food or liquids.  · Breathing problems, such as wheezing or shortness of  breath.  · Bluish skin. This can be caused by lack of oxygen.  · Coughing up food or mucus. The mucus might contain blood, greenish material, or yellowish-white fluid (pus).  · Fever.  · Chest pain.  · Being more tired than usual (fatigue).  · Sweating more than usual.  · Bad breath.  How is this diagnosed?  A physical exam will be done. During the exam, the health care provider will listen to your lungs with a stethoscope to check for:  · Crackling sounds in the lungs.  · Decreased breath sounds.  · A rapid heartbeat.  Various tests may be ordered. These may include:  · Chest X-ray.  · CT scan.  · Swallowing study. This test looks at how food is swallowed and whether it goes into your breathing tube (trachea) or food pipe (esophagus).  · Sputum culture. Saliva and mucus (sputum) are collected from the lungs or the tubes that carry air to the lungs (bronchi). The sputum is then tested for bacteria.  · Bronchoscopy. This test uses a flexible tube (bronchoscope) to see inside the lungs.  How is this treated?  Treatment will usually include antibiotic medicines. Other medicines may also be used to reduce fever or pain. You may need to be treated in the hospital. In the hospital, your breathing will be carefully monitored. Depending on how well you are breathing, you may need to be given oxygen, or you may need breathing support from a breathing machine (ventilator). For people who fail a swallowing study, a feeding tube might be placed in the stomach, or they may be asked to avoid certain food textures or liquids when they eat.  Follow these instructions at home:  · Carefully follow any special eating instructions you were given, such as avoiding certain food textures or thickening liquids. This reduces the risk of developing aspiration pneumonia again.  · Only take over-the-counter or prescription medicines as directed by your health care provider. Follow the directions carefully.  · If you were prescribed  antibiotics, take them as directed. Finish them even if you start to feel better.  · Rest as instructed by your health care provider.  · Keep all follow-up appointments with your health care provider.  Contact a health care provider if:  · You develop worsening shortness of breath, wheezing, or difficulty breathing.  · You develop a fever.  · You have chest pain.  This information is not intended to replace advice given to you by your health care provider. Make sure you discuss any questions you have with your health care provider.  Document Released: 10/15/2010 Document Revised: 05/31/2017 Document Reviewed: 06/05/2014  [employer] Interactive Patient Education © 2017 [employer]  Cefdinir capsules  What is this medicine?  CEFDINIR (SEF di ner) is a cephalosporin antibiotic. It is used to treat certain kinds of bacterial infections. It will not work for colds, flu, or other viral infections.  This medicine may be used for other purposes; ask your health care provider or pharmacist if you have questions.  COMMON BRAND NAME(S): Omnicef  What should I tell my health care provider before I take this medicine?  They need to know if you have any of these conditions:  -bleeding problems  -kidney disease  -stomach or intestine problems (especially colitis)  -an unusual or allergic reaction to cefdinir, other cephalosporin antibiotics, penicillin, penicillamine, other foods, dyes or preservatives  -pregnant or trying to get pregnant  -breast-feeding  How should I use this medicine?  Take this medicine by mouth. Swallow it with a drink of water. Follow the directions on the prescription label. You can take it with or without food. If it upsets your stomach it may help to take it with food. Take your doses at regular intervals. Do not take it more often than directed. Finish all the medicine you are prescribed even if you think your infection is better.  Talk to your pediatrician regarding the use of this medicine in children.  Special care may be needed.  Overdosage: If you think you have taken too much of this medicine contact a poison control center or emergency room at once.  NOTE: This medicine is only for you. Do not share this medicine with others.  What if I miss a dose?  If you miss a dose, take it as soon as you can. If it is almost time for your next dose, take only that dose. Do not take double or extra doses.  What may interact with this medicine?  -antacids that contain aluminum or magnesium  -iron supplements  -other antibiotics  -probenecid  This list may not describe all possible interactions. Give your health care provider a list of all the medicines, herbs, non-prescription drugs, or dietary supplements you use. Also tell them if you smoke, drink alcohol, or use illegal drugs. Some items may interact with your medicine.  What should I watch for while using this medicine?  Tell your doctor or health care professional if your symptoms do not get better in a few days.  If you are diabetic you may get a false-positive result for sugar in your urine. Check with your doctor or health care professional before you change your diet or the dose of your diabetes medicine.  What side effects may I notice from receiving this medicine?  Side effects that you should report to your doctor or health care professional as soon as possible:  -allergic reactions like skin rash, itching or hives, swelling of the face, lips, or tongue  -bloody or watery diarrhea  -breathing problems  -fever  -redness, blistering, peeling or loosening of the skin, including inside the mouth  -seizures  -trouble passing urine or change in the amount of urine  -unusual bleeding or bruising  -unusually weak or tired  Side effects that usually do not require medical attention (report to your doctor or health care professional if they continue or are bothersome):  -constipation  -diarrhea  -dizziness  -dry mouth  -headache  -loss of appetite  -nausea,  vomiting  -stomach pain  -stool discoloration  -tiredness  -vaginal discharge, itching, or odor in women  This list may not describe all possible side effects. Call your doctor for medical advice about side effects. You may report side effects to FDA at 7-710-FDA-6171.  Where should I keep my medicine?  Keep out of the reach of children.  Store at room temperature between 15 and 30 degrees C (59 and 86 degrees F). Throw the medicine away after the expiration date.  NOTE: This sheet is a summary. It may not cover all possible information. If you have questions about this medicine, talk to your doctor, pharmacist, or health care provider.  © 2018 [employer]/Gold Standard (2017-04-24 15:52:44)  Metoprolol tablets  What is this medicine?  METOPROLOL (me TOE proe lole) is a beta-blocker. Beta-blockers reduce the workload on the heart and help it to beat more regularly. This medicine is used to treat high blood pressure and to prevent chest pain. It is also used to after a heart attack and to prevent an additional heart attack from occurring.  This medicine may be used for other purposes; ask your health care provider or pharmacist if you have questions.  COMMON BRAND NAME(S): Lopressor  What should I tell my health care provider before I take this medicine?  They need to know if you have any of these conditions:  -diabetes  -heart or vessel disease like slow heart rate, worsening heart failure, heart block, sick sinus syndrome or Raynaud's disease  -kidney disease  -liver disease  -lung or breathing disease, like asthma or emphysema  -pheochromocytoma  -thyroid disease  -an unusual or allergic reaction to metoprolol, other beta-blockers, medicines, foods, dyes, or preservatives  -pregnant or trying to get pregnant  -breast-feeding  How should I use this medicine?  Take this medicine by mouth with a drink of water. Follow the directions on the prescription label. Take this medicine immediately after meals. Take your doses  at regular intervals. Do not take more medicine than directed. Do not stop taking this medicine suddenly. This could lead to serious heart-related effects.  Talk to your pediatrician regarding the use of this medicine in children. Special care may be needed.  Overdosage: If you think you have taken too much of this medicine contact a poison control center or emergency room at once.  NOTE: This medicine is only for you. Do not share this medicine with others.  What if I miss a dose?  If you miss a dose, take it as soon as you can. If it is almost time for your next dose, take only that dose. Do not take double or extra doses.  What may interact with this medicine?  This medicine may interact with the following medications:  -certain medicines for blood pressure, heart disease, irregular heart beat  -certain medicines for depression like monoamine oxidase (MAO) inhibitors, fluoxetine, or paroxetine  -clonidine  -dobutamine  -epinephrine  -isoproterenol  -reserpine  This list may not describe all possible interactions. Give your health care provider a list of all the medicines, herbs, non-prescription drugs, or dietary supplements you use. Also tell them if you smoke, drink alcohol, or use illegal drugs. Some items may interact with your medicine.  What should I watch for while using this medicine?  Visit your doctor or health care professional for regular check ups. Contact your doctor right away if your symptoms worsen. Check your blood pressure and pulse rate regularly. Ask your health care professional what your blood pressure and pulse rate should be, and when you should contact them.  You may get drowsy or dizzy. Do not drive, use machinery, or do anything that needs mental alertness until you know how this medicine affects you. Do not sit or stand up quickly, especially if you are an older patient. This reduces the risk of dizzy or fainting spells. Contact your doctor if these symptoms continue. Alcohol may  interfere with the effect of this medicine. Avoid alcoholic drinks.  What side effects may I notice from receiving this medicine?  Side effects that you should report to your doctor or health care professional as soon as possible:  -allergic reactions like skin rash, itching or hives  -cold or numb hands or feet  -depression  -difficulty breathing  -faint  -fever with sore throat  -irregular heartbeat, chest pain  -rapid weight gain  -swollen legs or ankles  Side effects that usually do not require medical attention (report to your doctor or health care professional if they continue or are bothersome):  -anxiety or nervousness  -change in sex drive or performance  -dry skin  -headache  -nightmares or trouble sleeping  -short term memory loss  -stomach upset or diarrhea  -unusually tired  This list may not describe all possible side effects. Call your doctor for medical advice about side effects. You may report side effects to FDA at 9-371-FDA-0160.  Where should I keep my medicine?  Keep out of the reach of children.  Store at room temperature between 15 and 30 degrees C (59 and 86 degrees F). Throw away any unused medicine after the expiration date.  NOTE: This sheet is a summary. It may not cover all possible information. If you have questions about this medicine, talk to your doctor, pharmacist, or health care provider.  © 2018 Elsevier/Gold Standard (2014-08-22 14:40:36)      · Is patient discharged on Warfarin / Coumadin?   No     Depression / Suicide Risk    As you are discharged from this [facility] facility, it is important to learn how to keep safe from harming yourself.    Recognize the warning signs:  · Abrupt changes in personality, positive or negative- including increase in energy   · Giving away possessions  · Change in eating patterns- significant weight changes-  positive or negative  · Change in sleeping patterns- unable to sleep or sleeping all the time   · Unwillingness or inability to  communicate  · Depression  · Unusual sadness, discouragement and loneliness  · Talk of wanting to die  · Neglect of personal appearance   · Rebelliousness- reckless behavior  · Withdrawal from people/activities they love  · Confusion- inability to concentrate     If you or a loved one observes any of these behaviors or has concerns about self-harm, here's what you can do:  · Talk about it- your feelings and reasons for harming yourself  · Remove any means that you might use to hurt yourself (examples: pills, rope, extension cords, firearm)  · Get professional help from the community (Mental Health, Substance Abuse, psychological counseling)  · Do not be alone:Call your Safe Contact- someone whom you trust who will be there for you.  · Call your local CRISIS HOTLINE 035-3121 or 888-590-7872  · Call your local Children's Mobile Crisis Response Team [place] (494) 052-7297 or www.[facility]  · Call the toll free National Suicide Prevention Hotlines   · National Suicide Prevention Lifeline 681-055-KOQQ (4260)  · National Hope Line Network 800-SUICIDE (441-0695)

## 2018-06-12 NOTE — DOCUMENTATION QUERY
DOCUMENTATION QUERY    PROVIDERS: Please select “Cosign w/ note”to reply to query.    To better represent the severity of illness of your patient, please review the following information and exercise your independent professional judgment in responding to this query.     BMI 16.7 is noted in the Dietary note. Based upon the clinical findings, risk factors, and treatment, can this diagnosis be further specified?    • Underweight  • Cachexia  • Unable to determine  • Findings of no clinical significance  • Other explanation of clinical findings      The medical record reflects the following:   Clinical Findings  Dietary Note  - BMI 16.79   Treatment  dietary consult, protein centered afternoon snack, family to bring in food from home, document intake of all meals, monitor weight   Risk Factors  age, BMI   Location within medical record  Progress Notes     Thank you,   Rebeca Buckley MSN, RN, CNL, CNML  Clinical   [email or web address]  [phone]

## 2018-06-13 LAB
BACTERIA BLD CULT: NORMAL
SIGNIFICANT IND 70042: NORMAL
SITE SITE: NORMAL
SOURCE SOURCE: NORMAL

## 2018-06-14 LAB
BACTERIA BLD CULT: NORMAL
SIGNIFICANT IND 70042: NORMAL
SITE SITE: NORMAL
SOURCE SOURCE: NORMAL

## 2018-06-24 LAB — EKG IMPRESSION: NORMAL

## 2019-02-21 NOTE — PROGRESS NOTES
1145 Patient's sitting up in bed, having lunch. IVF patent & infusing. Son visiting. Fall protocol in effect.   Yes

## 2021-01-14 DIAGNOSIS — Z23 NEED FOR VACCINATION: ICD-10-CM

## 2021-04-29 NOTE — PROGRESS NOTES
1724 Patient's in bed. IVF patent & infusing. No distress noted. Fall Protocol in effect. Remains NPO.Isma (son) at the bedside.    Carmelo Gaitan,    It was nice meeting you today!    Please let me know if you have any questions,    GARCIA Zhou DNP.

## 2023-12-01 NOTE — ASSESSMENT & PLAN NOTE
Has improved a bit  Iv fluid n.s  Will check bmp in am  Add salt tablets     <-- Click to add NO pertinent Past Medical History
